# Patient Record
Sex: FEMALE | Race: WHITE | Employment: OTHER | URBAN - METROPOLITAN AREA
[De-identification: names, ages, dates, MRNs, and addresses within clinical notes are randomized per-mention and may not be internally consistent; named-entity substitution may affect disease eponyms.]

---

## 2017-02-01 PROCEDURE — 87077 CULTURE AEROBIC IDENTIFY: CPT | Performed by: INTERNAL MEDICINE

## 2017-02-01 PROCEDURE — 87186 SC STD MICRODIL/AGAR DIL: CPT | Performed by: INTERNAL MEDICINE

## 2017-02-01 PROCEDURE — 87086 URINE CULTURE/COLONY COUNT: CPT | Performed by: INTERNAL MEDICINE

## 2017-04-12 ENCOUNTER — TELEPHONE (OUTPATIENT)
Dept: ENDOCRINOLOGY CLINIC | Facility: CLINIC | Age: 42
End: 2017-04-12

## 2017-04-12 DIAGNOSIS — Z00.00 ROUTINE HEALTH MAINTENANCE: Primary | ICD-10-CM

## 2017-04-12 NOTE — TELEPHONE ENCOUNTER
Pt called to request that orders that were issued in Sept 2016 be faxed to her personal electronic fax # [phone]. Pt is also requesting an order that includes glucose test, cholesterol and triglycerides. Could Dr. Johnny Alatorre add this to existing orders?

## 2017-04-15 ENCOUNTER — LAB ENCOUNTER (OUTPATIENT)
Dept: LAB | Facility: HOSPITAL | Age: 42
End: 2017-04-15
Attending: INTERNAL MEDICINE
Payer: COMMERCIAL

## 2017-04-15 DIAGNOSIS — E05.90 HYPERTHYROIDISM: Primary | ICD-10-CM

## 2017-04-15 PROCEDURE — 84481 FREE ASSAY (FT-3): CPT

## 2017-04-15 PROCEDURE — 36415 COLL VENOUS BLD VENIPUNCTURE: CPT

## 2017-04-15 PROCEDURE — 84443 ASSAY THYROID STIM HORMONE: CPT

## 2017-04-15 PROCEDURE — 84439 ASSAY OF FREE THYROXINE: CPT

## 2017-05-26 ENCOUNTER — OFFICE VISIT (OUTPATIENT)
Dept: ENDOCRINOLOGY CLINIC | Facility: CLINIC | Age: 42
End: 2017-05-26

## 2017-05-26 VITALS
BODY MASS INDEX: 19.77 KG/M2 | HEIGHT: 64 IN | HEART RATE: 69 BPM | SYSTOLIC BLOOD PRESSURE: 88 MMHG | DIASTOLIC BLOOD PRESSURE: 55 MMHG | WEIGHT: 115.81 LBS

## 2017-05-26 DIAGNOSIS — E05.90 SUBCLINICAL HYPERTHYROIDISM: ICD-10-CM

## 2017-05-26 DIAGNOSIS — M85.89 OSTEOPENIA OF MULTIPLE SITES: Primary | ICD-10-CM

## 2017-05-26 DIAGNOSIS — E04.1 THYROID NODULE: ICD-10-CM

## 2017-05-26 PROCEDURE — 99213 OFFICE O/P EST LOW 20 MIN: CPT | Performed by: INTERNAL MEDICINE

## 2017-05-26 PROCEDURE — 99212 OFFICE O/P EST SF 10 MIN: CPT | Performed by: INTERNAL MEDICINE

## 2017-05-26 NOTE — PROGRESS NOTES
Name: Gustavo Frazier  Date: 5/26/2017    Referring Physician: No ref.  provider found    Patient presents with:  Thyroid Problem      HISTORY OF PRESENT ILLNESS   Gustavo Frazier is a 46year old female who presents for Patient presents with:  Thyroid Pr 10 MG Oral Tab, Take 10 mg by mouth as needed.   , Disp: , Rfl:      Allergies:     Bee Venom               Swelling  Sulfa Antibiotics       Hives    Social History:   Social History    Marital Status:              Spouse Name: plan to monitor     2. Osteopenia  -Discussed that likely due to slight frame, miscalculation  -Recommend calcium 1000mg daily  -Normal BSAP  -Continue Vitamin D supplementation  -Repeat DEXA 12/2018    3.  Thyroid Nodules  -Discussed common occurrence of t

## 2017-10-06 PROBLEM — N81.10 CYSTOCELE WITHOUT UTERINE PROLAPSE: Status: ACTIVE | Noted: 2017-10-06

## 2017-10-06 PROCEDURE — 88175 CYTOPATH C/V AUTO FLUID REDO: CPT | Performed by: FAMILY MEDICINE

## 2017-10-30 PROBLEM — M62.81 MUSCLE WEAKNESS (GENERALIZED): Status: ACTIVE | Noted: 2017-10-30

## 2018-06-20 ENCOUNTER — LAB SERVICES (OUTPATIENT)
Dept: OTHER | Age: 43
End: 2018-06-20

## 2018-06-20 ENCOUNTER — CHARTING TRANS (OUTPATIENT)
Dept: OTHER | Age: 43
End: 2018-06-20

## 2018-06-20 LAB — RAPID STREP GROUP A: POSITIVE

## 2018-06-30 ENCOUNTER — CHARTING TRANS (OUTPATIENT)
Dept: OTHER | Age: 43
End: 2018-06-30

## 2018-11-01 VITALS
TEMPERATURE: 99.1 F | RESPIRATION RATE: 16 BRPM | HEIGHT: 64 IN | DIASTOLIC BLOOD PRESSURE: 70 MMHG | BODY MASS INDEX: 19.63 KG/M2 | WEIGHT: 115 LBS | HEART RATE: 75 BPM | SYSTOLIC BLOOD PRESSURE: 100 MMHG

## (undated) NOTE — MR AVS SNAPSHOT
[facility]  [address] [place] [address] [phone]               Thank you for choosing us for your health care visit with Hua Johnson MD.  We are glad to serve you and happy to provide you with this summary of your visit.   Ple acquired. Please contact the Patient Business Office at [phone] if you have any questions related to insurance coverage. Friday May 26, 2017     Imaging:  XR DEXA BONE DENSITOMETRY (CPT=[id number])    Instructions:   [facility] You can access your MyChart to more actively manage your health care and view more details from this visit by going to [email or web address].   If you've recently had a stay at the Hospital you can access your discharge instructions in [address] by sandoval